# Patient Record
Sex: FEMALE | Race: WHITE
[De-identification: names, ages, dates, MRNs, and addresses within clinical notes are randomized per-mention and may not be internally consistent; named-entity substitution may affect disease eponyms.]

---

## 2020-04-29 ENCOUNTER — HOSPITAL ENCOUNTER (INPATIENT)
Dept: HOSPITAL 94 - ER | Age: 80
LOS: 2 days | Discharge: NURSING, REHAB OR LONG-TERM CARE | DRG: 682 | End: 2020-05-01
Attending: INTERNAL MEDICINE | Admitting: SPECIALIST
Payer: MEDICARE

## 2020-04-29 VITALS — HEIGHT: 63 IN | WEIGHT: 65.7 LBS | BODY MASS INDEX: 11.64 KG/M2

## 2020-04-29 DIAGNOSIS — L03.311: ICD-10-CM

## 2020-04-29 DIAGNOSIS — E87.0: ICD-10-CM

## 2020-04-29 DIAGNOSIS — M41.9: ICD-10-CM

## 2020-04-29 DIAGNOSIS — Z79.899: ICD-10-CM

## 2020-04-29 DIAGNOSIS — R62.7: ICD-10-CM

## 2020-04-29 DIAGNOSIS — Z99.3: ICD-10-CM

## 2020-04-29 DIAGNOSIS — Z74.01: ICD-10-CM

## 2020-04-29 DIAGNOSIS — N18.9: ICD-10-CM

## 2020-04-29 DIAGNOSIS — E43: ICD-10-CM

## 2020-04-29 DIAGNOSIS — I12.9: ICD-10-CM

## 2020-04-29 DIAGNOSIS — Q61.3: ICD-10-CM

## 2020-04-29 DIAGNOSIS — Z90.12: ICD-10-CM

## 2020-04-29 DIAGNOSIS — G24.3: ICD-10-CM

## 2020-04-29 DIAGNOSIS — N17.9: Primary | ICD-10-CM

## 2020-04-29 DIAGNOSIS — E86.0: ICD-10-CM

## 2020-04-29 LAB
ALBUMIN SERPL BCP-MCNC: 2.2 G/DL (ref 3.4–5)
ALBUMIN/GLOB SERPL: 0.7 {RATIO} (ref 1.1–1.5)
ALP SERPL-CCNC: 102 IU/L (ref 46–116)
ALT SERPL W P-5'-P-CCNC: 20 U/L (ref 12–78)
ANION GAP SERPL CALCULATED.3IONS-SCNC: 4 MMOL/L (ref 8–16)
AST SERPL W P-5'-P-CCNC: 20 U/L (ref 10–37)
BASOPHILS # BLD AUTO: 0 X10'3 (ref 0–0.2)
BASOPHILS NFR BLD AUTO: 0.6 % (ref 0–1)
BILIRUB SERPL-MCNC: 0.2 MG/DL (ref 0.1–1)
BUN SERPL-MCNC: 30 MG/DL (ref 7–18)
BUN/CREAT SERPL: 25.9 (ref 6.6–38)
CALCIUM SERPL-MCNC: 8.6 MG/DL (ref 8.5–10.1)
CHLORIDE SERPL-SCNC: 108 MMOL/L (ref 99–107)
CO2 SERPL-SCNC: 34.1 MMOL/L (ref 24–32)
CREAT SERPL-MCNC: 1.16 MG/DL (ref 0.4–0.9)
EOSINOPHIL # BLD AUTO: 0.1 X10'3 (ref 0–0.9)
EOSINOPHIL NFR BLD AUTO: 0.9 % (ref 0–6)
ERYTHROCYTE [DISTWIDTH] IN BLOOD BY AUTOMATED COUNT: 13.8 % (ref 11.5–14.5)
GFR SERPL CREATININE-BSD FRML MDRD: 45 ML/MIN
GLUCOSE SERPL-MCNC: 104 MG/DL (ref 70–104)
HCT VFR BLD AUTO: 37.1 % (ref 35–45)
HGB BLD-MCNC: 12.3 G/DL (ref 12–16)
LG PLATELETS BLD QL SMEAR: (no result)
LYMPHOCYTES # BLD AUTO: 0.8 X10'3 (ref 1.1–4.8)
LYMPHOCYTES NFR BLD AUTO: 10.5 % (ref 21–51)
MAGNESIUM SERPL-MCNC: 2.3 MG/DL (ref 1.5–2.4)
MCH RBC QN AUTO: 32 PG (ref 27–31)
MCHC RBC AUTO-ENTMCNC: 33.1 G/DL (ref 33–36.5)
MCV RBC AUTO: 96.8 FL (ref 78–98)
MONOCYTES # BLD AUTO: 0.5 X10'3 (ref 0–0.9)
MONOCYTES NFR BLD AUTO: 6.2 % (ref 2–12)
NEUTROPHILS # BLD AUTO: 6.4 X10'3 (ref 1.8–7.7)
NEUTROPHILS NFR BLD AUTO: 81.8 % (ref 42–75)
PLATELET # BLD AUTO: 172 X10'3 (ref 140–440)
PLATELET BLD QL SMEAR: NORMAL
PMV BLD AUTO: 12 FL (ref 7.4–10.4)
POTASSIUM SERPL-SCNC: 4.1 MMOL/L (ref 3.5–5.1)
PROT SERPL-MCNC: 5.4 G/DL (ref 6.4–8.2)
RBC # BLD AUTO: 3.83 X10'6 (ref 4.2–5.6)
SODIUM SERPL-SCNC: 146 MMOL/L (ref 135–145)
WBC # BLD AUTO: 7.9 X10'3 (ref 4.5–11)

## 2020-04-29 PROCEDURE — 96365 THER/PROPH/DIAG IV INF INIT: CPT

## 2020-04-29 PROCEDURE — 87077 CULTURE AEROBIC IDENTIFY: CPT

## 2020-04-29 PROCEDURE — 83605 ASSAY OF LACTIC ACID: CPT

## 2020-04-29 PROCEDURE — 83735 ASSAY OF MAGNESIUM: CPT

## 2020-04-29 PROCEDURE — 96361 HYDRATE IV INFUSION ADD-ON: CPT

## 2020-04-29 PROCEDURE — 92616 FEES W/LARYNGEAL SENSE TEST: CPT

## 2020-04-29 PROCEDURE — 84145 PROCALCITONIN (PCT): CPT

## 2020-04-29 PROCEDURE — 87088 URINE BACTERIA CULTURE: CPT

## 2020-04-29 PROCEDURE — 92508 TX SP LANG VOICE COMM GROUP: CPT

## 2020-04-29 PROCEDURE — 97530 THERAPEUTIC ACTIVITIES: CPT

## 2020-04-29 PROCEDURE — 81001 URINALYSIS AUTO W/SCOPE: CPT

## 2020-04-29 PROCEDURE — 36415 COLL VENOUS BLD VENIPUNCTURE: CPT

## 2020-04-29 PROCEDURE — 87081 CULTURE SCREEN ONLY: CPT

## 2020-04-29 PROCEDURE — 87186 SC STD MICRODIL/AGAR DIL: CPT

## 2020-04-29 PROCEDURE — 97161 PT EVAL LOW COMPLEX 20 MIN: CPT

## 2020-04-29 PROCEDURE — 85025 COMPLETE CBC W/AUTO DIFF WBC: CPT

## 2020-04-29 PROCEDURE — 80053 COMPREHEN METABOLIC PANEL: CPT

## 2020-04-29 PROCEDURE — 87040 BLOOD CULTURE FOR BACTERIA: CPT

## 2020-04-29 PROCEDURE — 99285 EMERGENCY DEPT VISIT HI MDM: CPT

## 2020-04-30 VITALS — DIASTOLIC BLOOD PRESSURE: 58 MMHG | SYSTOLIC BLOOD PRESSURE: 124 MMHG

## 2020-04-30 VITALS — SYSTOLIC BLOOD PRESSURE: 141 MMHG | DIASTOLIC BLOOD PRESSURE: 73 MMHG

## 2020-04-30 VITALS — SYSTOLIC BLOOD PRESSURE: 138 MMHG | DIASTOLIC BLOOD PRESSURE: 82 MMHG

## 2020-04-30 VITALS — SYSTOLIC BLOOD PRESSURE: 150 MMHG | DIASTOLIC BLOOD PRESSURE: 79 MMHG

## 2020-04-30 LAB
BACTERIA URNS QL MICRO: (no result) /HPF
CLARITY UR: (no result)
COLOR UR: YELLOW
DEPRECATED SQUAMOUS URNS QL MICRO: (no result) /LPF
GLUCOSE UR STRIP-MCNC: NEGATIVE MG/DL
HGB UR QL STRIP: (no result)
KETONES UR STRIP-MCNC: NEGATIVE MG/DL
LEUKOCYTE ESTERASE UR QL STRIP: (no result)
NITRITE UR QL STRIP: NEGATIVE
PH UR STRIP: 8 [PH] (ref 4.8–8)
PROT UR QL STRIP: 100 MG/DL
SP GR UR STRIP: 1.02 (ref 1–1.03)
URN COLLECT METHOD CLASS: (no result)
UROBILINOGEN UR STRIP-MCNC: 0.2 E.U/DL (ref 0.2–1)
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
## 2020-04-30 NOTE — NUR
Patient in room [facility] 4012. I have received report from Peyton MCGOVERN and had the opportunity to 
ask questions and assume patient care.

## 2020-04-30 NOTE — NUR
received call from family member Manju, pt niece. She was concerned saying, "I hope you 
people aren't feeding her food!  She hasn't eaten any solid food for a year!" I attempted to 
explain to her that the pt had had a speech therapy eval and was put on pureed foods and has 
been offered ensure drinks, but the family member kept interrupting me and using foul 
language.  Manju stated that the patient was supposed to have a cardiac workup for a 
"murmur" that the [facility] MD was concerned about.  I explained that to the best of my knowledge 
her admit was related to malnutrition and concern for chemical imbalance related to that. 
Manju demanded that I have the hospitalist call her back (she was also upset that the [facility] 
MD was not overseeing her care in the hospital), however she hung up before I could get the 
number to call back. I spoke to [facility] triage RN and obtained the contact info

## 2020-04-30 NOTE — NUR
brought to room [facility] via [facility], transferred to bed with ease.  pt a&o, moves with ease in 
bed.  received report from ER RN.

-------------------------------------------------------------------------------

Addendum: 04/30/20 at 0315 by Jania Pierre RN

-------------------------------------------------------------------------------

Amended: Links added.
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
## 2020-04-30 NOTE — NUR
Pt moves easily, turns freq side to side.

-------------------------------------------------------------------------------

Addendum: 04/30/20 at 0315 by Jania Pierre RN

-------------------------------------------------------------------------------

Amended: Links added.

## 2020-04-30 NOTE — NUR
Problems reprioritized. Patient report given, questions answered & plan of care reviewed 
with  RN. pt janis in [place] with PT.

-------------------------------------------------------------------------------

Addendum: 04/30/20 at 0629 by Jania Pierre RN

-------------------------------------------------------------------------------

Amended: Links added.

## 2020-05-01 VITALS — SYSTOLIC BLOOD PRESSURE: 154 MMHG | DIASTOLIC BLOOD PRESSURE: 64 MMHG

## 2020-05-01 VITALS — SYSTOLIC BLOOD PRESSURE: 139 MMHG | DIASTOLIC BLOOD PRESSURE: 93 MMHG

## 2020-05-01 LAB
ALBUMIN SERPL BCP-MCNC: 1.7 G/DL (ref 3.4–5)
ALBUMIN/GLOB SERPL: 0.6 {RATIO} (ref 1.1–1.5)
ALP SERPL-CCNC: 69 IU/L (ref 46–116)
ALT SERPL W P-5'-P-CCNC: 17 U/L (ref 12–78)
ANION GAP SERPL CALCULATED.3IONS-SCNC: 6 MMOL/L (ref 8–16)
AST SERPL W P-5'-P-CCNC: 27 U/L (ref 10–37)
BASOPHILS # BLD AUTO: 0.1 X10'3 (ref 0–0.2)
BASOPHILS NFR BLD AUTO: 1.2 % (ref 0–1)
BILIRUB SERPL-MCNC: 0.3 MG/DL (ref 0.1–1)
BUN SERPL-MCNC: 25 MG/DL (ref 7–18)
BUN/CREAT SERPL: 25.3 (ref 6.6–38)
CALCIUM SERPL-MCNC: 8.3 MG/DL (ref 8.5–10.1)
CHLORIDE SERPL-SCNC: 114 MMOL/L (ref 99–107)
CO2 SERPL-SCNC: 28.2 MMOL/L (ref 24–32)
CREAT SERPL-MCNC: 0.99 MG/DL (ref 0.4–0.9)
EOSINOPHIL # BLD AUTO: 0.1 X10'3 (ref 0–0.9)
EOSINOPHIL NFR BLD AUTO: 1.6 % (ref 0–6)
ERYTHROCYTE [DISTWIDTH] IN BLOOD BY AUTOMATED COUNT: 14.3 % (ref 11.5–14.5)
GFR SERPL CREATININE-BSD FRML MDRD: 54 ML/MIN
GLUCOSE SERPL-MCNC: 89 MG/DL (ref 70–104)
HCT VFR BLD AUTO: 31.3 % (ref 35–45)
HGB BLD-MCNC: 10.4 G/DL (ref 12–16)
LG PLATELETS BLD QL SMEAR: (no result)
LYMPHOCYTES # BLD AUTO: 0.8 X10'3 (ref 1.1–4.8)
LYMPHOCYTES NFR BLD AUTO: 11 % (ref 21–51)
MCH RBC QN AUTO: 32.2 PG (ref 27–31)
MCHC RBC AUTO-ENTMCNC: 33.3 G/DL (ref 33–36.5)
MCV RBC AUTO: 96.8 FL (ref 78–98)
MONOCYTES # BLD AUTO: 0.4 X10'3 (ref 0–0.9)
MONOCYTES NFR BLD AUTO: 5.9 % (ref 2–12)
NEUTROPHILS # BLD AUTO: 5.5 X10'3 (ref 1.8–7.7)
NEUTROPHILS NFR BLD AUTO: 80.3 % (ref 42–75)
PLATELET # BLD AUTO: 135 X10'3 (ref 140–440)
PLATELET BLD QL SMEAR: (no result)
PMV BLD AUTO: 12.1 FL (ref 7.4–10.4)
POTASSIUM SERPL-SCNC: 4 MMOL/L (ref 3.5–5.1)
PROT SERPL-MCNC: 4.5 G/DL (ref 6.4–8.2)
RBC # BLD AUTO: 3.24 X10'6 (ref 4.2–5.6)
SODIUM SERPL-SCNC: 148 MMOL/L (ref 135–145)
WBC # BLD AUTO: 6.9 X10'3 (ref 4.5–11)

## 2020-05-01 NOTE — NUR
Patient stable for discharge. Belongings gathered and sent with patient to [facility]. PIV 
removed cannula intact. Wound care and pictures taken. Family made aware of transfer.

## 2020-05-01 NOTE — NUR
Problems reprioritized. Patient report given, questions answered & plan of care reviewed 
with YENNY MCGOVERN.

## 2020-09-03 ENCOUNTER — HOSPITAL ENCOUNTER (EMERGENCY)
Dept: HOSPITAL 94 - ER | Age: 80
LOS: 1 days | Discharge: NURSING, REHAB OR LONG-TERM CARE | End: 2020-09-04
Payer: MEDICARE

## 2020-09-03 VITALS — WEIGHT: 80.16 LBS | HEIGHT: 64 IN | BODY MASS INDEX: 13.69 KG/M2

## 2020-09-03 VITALS — SYSTOLIC BLOOD PRESSURE: 87 MMHG | DIASTOLIC BLOOD PRESSURE: 54 MMHG

## 2020-09-03 DIAGNOSIS — Z98.890: ICD-10-CM

## 2020-09-03 DIAGNOSIS — Z43.1: Primary | ICD-10-CM

## 2020-09-03 DIAGNOSIS — I10: ICD-10-CM

## 2020-09-03 DIAGNOSIS — Z79.899: ICD-10-CM

## 2020-09-03 PROCEDURE — 99285 EMERGENCY DEPT VISIT HI MDM: CPT

## 2020-09-10 ENCOUNTER — HOSPITAL ENCOUNTER (OUTPATIENT)
Dept: HOSPITAL 94 - SSTAY O | Age: 80
Discharge: NURSING, REHAB OR LONG-TERM CARE | End: 2020-09-10
Attending: RADIOLOGY
Payer: MEDICARE

## 2020-09-10 VITALS — WEIGHT: 145.28 LBS | HEIGHT: 63 IN | BODY MASS INDEX: 25.74 KG/M2

## 2020-09-10 VITALS — SYSTOLIC BLOOD PRESSURE: 114 MMHG | DIASTOLIC BLOOD PRESSURE: 51 MMHG

## 2020-09-10 VITALS — SYSTOLIC BLOOD PRESSURE: 106 MMHG | DIASTOLIC BLOOD PRESSURE: 58 MMHG
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
## 2020-09-10 DIAGNOSIS — K94.29: Primary | ICD-10-CM
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
## 2020-09-10 DIAGNOSIS — Z79.899: ICD-10-CM

## 2020-09-10 PROCEDURE — 74018 RADEX ABDOMEN 1 VIEW: CPT

## 2020-09-10 PROCEDURE — 49465 FLUORO EXAM OF G/COLON TUBE: CPT

## 2020-09-10 NOTE — NUR
Dr. Kitty hughes. MD stated that consent for this procedure is implied and a signature 
is not needed.

## 2020-09-10 NOTE — NUR
Called , spoke to [place], . Pt signs for herself at facility, however at 
this time pt is not oriented to purpose, place or time. Attempted to call niece to obtain 
consent, message left.

## 2020-09-10 NOTE — NUR
Procedure performed at bedside by angio RN & rad tech. No new tube placed, suture previously 
placed removed. Contrast injected, x-ray obtained. Dr. Tang determined existing G tube in 
proper location.

## 2020-10-01 ENCOUNTER — HOSPITAL ENCOUNTER (EMERGENCY)
Dept: HOSPITAL 94 - ER | Age: 80
Discharge: HOME | End: 2020-10-01
Payer: MEDICARE

## 2020-10-01 VITALS — BODY MASS INDEX: 12.12 KG/M2 | WEIGHT: 60.12 LBS | HEIGHT: 59 IN

## 2020-10-01 VITALS — SYSTOLIC BLOOD PRESSURE: 145 MMHG | DIASTOLIC BLOOD PRESSURE: 80 MMHG

## 2020-10-01 DIAGNOSIS — I10: ICD-10-CM

## 2020-10-01 DIAGNOSIS — K94.23: Primary | ICD-10-CM

## 2020-10-01 DIAGNOSIS — Z79.899: ICD-10-CM

## 2020-10-01 LAB
ALBUMIN SERPL BCP-MCNC: 2.6 G/DL (ref 3.4–5)
ALBUMIN/GLOB SERPL: 0.7 {RATIO} (ref 1.1–1.5)
ALP SERPL-CCNC: 85 IU/L (ref 46–116)
ALT SERPL W P-5'-P-CCNC: 25 U/L (ref 12–78)
ANION GAP SERPL CALCULATED.3IONS-SCNC: 7 MMOL/L (ref 8–16)
AST SERPL W P-5'-P-CCNC: 24 U/L (ref 10–37)
BASOPHILS # BLD AUTO: 0 X10'3 (ref 0–0.2)
BASOPHILS NFR BLD AUTO: 0.2 % (ref 0–1)
BILIRUB SERPL-MCNC: 0.3 MG/DL (ref 0.1–1)
BUN SERPL-MCNC: 74 MG/DL (ref 7–18)
BUN/CREAT SERPL: 53.2 (ref 6.6–38)
CALCIUM SERPL-MCNC: 8.8 MG/DL (ref 8.5–10.1)
CHLORIDE SERPL-SCNC: 102 MMOL/L (ref 99–107)
CO2 SERPL-SCNC: 32.6 MMOL/L (ref 24–32)
CREAT SERPL-MCNC: 1.39 MG/DL (ref 0.4–0.9)
EOSINOPHIL # BLD AUTO: 0 X10'3 (ref 0–0.9)
EOSINOPHIL NFR BLD AUTO: 0.4 % (ref 0–6)
ERYTHROCYTE [DISTWIDTH] IN BLOOD BY AUTOMATED COUNT: 13.6 % (ref 11.5–14.5)
GFR SERPL CREATININE-BSD FRML MDRD: 36 ML/MIN
GIANT PLATELETS BLD QL SMEAR: (no result)
GLUCOSE SERPL-MCNC: 103 MG/DL (ref 70–104)
HCT VFR BLD AUTO: 33.4 % (ref 35–45)
HGB BLD-MCNC: 11 G/DL (ref 12–16)
LG PLATELETS BLD QL SMEAR: (no result)
LIPASE SERPL-CCNC: 399 U/L (ref 73–393)
LYMPHOCYTES # BLD AUTO: 0.6 X10'3 (ref 1.1–4.8)
LYMPHOCYTES NFR BLD AUTO: 6.3 % (ref 21–51)
MCH RBC QN AUTO: 30.4 PG (ref 27–31)
MCHC RBC AUTO-ENTMCNC: 32.9 G/DL (ref 33–36.5)
MCV RBC AUTO: 92.3 FL (ref 78–98)
MONOCYTES # BLD AUTO: 0.5 X10'3 (ref 0–0.9)
MONOCYTES NFR BLD AUTO: 5.9 % (ref 2–12)
NEUTROPHILS # BLD AUTO: 8.1 X10'3 (ref 1.8–7.7)
NEUTROPHILS NFR BLD AUTO: 87.2 % (ref 42–75)
PLATELET # BLD AUTO: 220 X10'3 (ref 140–440)
PLATELET BLD QL SMEAR: NORMAL
PMV BLD AUTO: 11.7 FL (ref 7.4–10.4)
POTASSIUM SERPL-SCNC: 3.9 MMOL/L (ref 3.5–5.1)
PROT SERPL-MCNC: 6.5 G/DL (ref 6.4–8.2)
RBC # BLD AUTO: 3.62 X10'6 (ref 4.2–5.6)
SODIUM SERPL-SCNC: 142 MMOL/L (ref 135–145)
WBC # BLD AUTO: 9.3 X10'3 (ref 4.5–11)

## 2020-10-01 PROCEDURE — 84145 PROCALCITONIN (PCT): CPT

## 2020-10-01 PROCEDURE — 80053 COMPREHEN METABOLIC PANEL: CPT

## 2020-10-01 PROCEDURE — 74176 CT ABD & PELVIS W/O CONTRAST: CPT

## 2020-10-01 PROCEDURE — 36415 COLL VENOUS BLD VENIPUNCTURE: CPT

## 2020-10-01 PROCEDURE — 85025 COMPLETE CBC W/AUTO DIFF WBC: CPT

## 2020-10-01 PROCEDURE — 83690 ASSAY OF LIPASE: CPT

## 2020-10-01 PROCEDURE — 99284 EMERGENCY DEPT VISIT MOD MDM: CPT

## 2020-10-01 PROCEDURE — 83605 ASSAY OF LACTIC ACID: CPT

## 2020-10-01 NOTE — NUR
call to Manju pt caregiver and stated spoke with dr chung and only lives 4 
blocks away and will be right down to help pt.

## 2020-10-23 ENCOUNTER — HOSPITAL ENCOUNTER (EMERGENCY)
Dept: HOSPITAL 94 - ER | Age: 80
Discharge: HOME | End: 2020-10-23
Payer: MEDICARE

## 2020-10-23 VITALS — BODY MASS INDEX: 10.16 KG/M2 | HEIGHT: 64 IN | WEIGHT: 59.52 LBS

## 2020-10-23 VITALS — DIASTOLIC BLOOD PRESSURE: 82 MMHG | SYSTOLIC BLOOD PRESSURE: 147 MMHG

## 2020-10-23 DIAGNOSIS — Z79.899: ICD-10-CM

## 2020-10-23 DIAGNOSIS — R10.9: Primary | ICD-10-CM

## 2020-10-23 DIAGNOSIS — I10: ICD-10-CM

## 2020-10-23 DIAGNOSIS — R91.1: ICD-10-CM

## 2020-10-23 DIAGNOSIS — Z00.00: ICD-10-CM

## 2020-10-23 PROCEDURE — 99281 EMR DPT VST MAYX REQ PHY/QHP: CPT

## 2020-11-08 ENCOUNTER — HOSPITAL ENCOUNTER (EMERGENCY)
Dept: HOSPITAL 94 - ER | Age: 80
Discharge: HOME | End: 2020-11-08
Payer: MEDICARE

## 2020-11-08 VITALS — DIASTOLIC BLOOD PRESSURE: 68 MMHG | SYSTOLIC BLOOD PRESSURE: 140 MMHG

## 2020-11-08 VITALS — WEIGHT: 69 LBS | HEIGHT: 64 IN | BODY MASS INDEX: 11.78 KG/M2

## 2020-11-08 DIAGNOSIS — K80.50: ICD-10-CM

## 2020-11-08 DIAGNOSIS — L03.311: Primary | ICD-10-CM

## 2020-11-08 DIAGNOSIS — I10: ICD-10-CM

## 2020-11-08 DIAGNOSIS — Z79.899: ICD-10-CM

## 2020-11-08 DIAGNOSIS — K94.23: ICD-10-CM

## 2020-11-08 LAB
ALBUMIN SERPL BCP-MCNC: 2.4 G/DL (ref 3.4–5)
ALBUMIN/GLOB SERPL: 0.7 {RATIO} (ref 1.1–1.5)
ALP SERPL-CCNC: 81 IU/L (ref 46–116)
ALT SERPL W P-5'-P-CCNC: 25 U/L (ref 12–78)
ANION GAP SERPL CALCULATED.3IONS-SCNC: 4 MMOL/L (ref 8–16)
AST SERPL W P-5'-P-CCNC: 30 U/L (ref 10–37)
BASOPHILS # BLD AUTO: 0 X10'3 (ref 0–0.2)
BASOPHILS NFR BLD AUTO: 0.4 % (ref 0–1)
BILIRUB SERPL-MCNC: 0.4 MG/DL (ref 0.1–1)
BUN SERPL-MCNC: 59 MG/DL (ref 7–18)
BUN/CREAT SERPL: 36.2 (ref 6.6–38)
CALCIUM SERPL-MCNC: 8.3 MG/DL (ref 8.5–10.1)
CHLORIDE SERPL-SCNC: 103 MMOL/L (ref 99–107)
CO2 SERPL-SCNC: 33.3 MMOL/L (ref 24–32)
CREAT SERPL-MCNC: 1.63 MG/DL (ref 0.4–0.9)
EOSINOPHIL # BLD AUTO: 0 X10'3 (ref 0–0.9)
EOSINOPHIL NFR BLD AUTO: 0.3 % (ref 0–6)
ERYTHROCYTE [DISTWIDTH] IN BLOOD BY AUTOMATED COUNT: 15.1 % (ref 11.5–14.5)
GFR SERPL CREATININE-BSD FRML MDRD: 30 ML/MIN
GLUCOSE SERPL-MCNC: 122 MG/DL (ref 70–104)
HCT VFR BLD AUTO: 34.2 % (ref 35–45)
HGB BLD-MCNC: 11.5 G/DL (ref 12–16)
LIPASE SERPL-CCNC: 258 U/L (ref 73–393)
LYMPHOCYTES # BLD AUTO: 0.8 X10'3 (ref 1.1–4.8)
LYMPHOCYTES NFR BLD AUTO: 7.1 % (ref 21–51)
MCH RBC QN AUTO: 31.7 PG (ref 27–31)
MCHC RBC AUTO-ENTMCNC: 33.6 G/DL (ref 33–36.5)
MCV RBC AUTO: 94.5 FL (ref 78–98)
MONOCYTES # BLD AUTO: 0.6 X10'3 (ref 0–0.9)
MONOCYTES NFR BLD AUTO: 5.6 % (ref 2–12)
NEUTROPHILS # BLD AUTO: 9.2 X10'3 (ref 1.8–7.7)
NEUTROPHILS NFR BLD AUTO: 86.6 % (ref 42–75)
PLATELET # BLD AUTO: 193 X10'3 (ref 140–440)
PMV BLD AUTO: 11.9 FL (ref 7.4–10.4)
POTASSIUM SERPL-SCNC: 4 MMOL/L (ref 3.5–5.1)
PROT SERPL-MCNC: 5.7 G/DL (ref 6.4–8.2)
RBC # BLD AUTO: 3.62 X10'6 (ref 4.2–5.6)
SODIUM SERPL-SCNC: 140 MMOL/L (ref 135–145)
WBC # BLD AUTO: 10.7 X10'3 (ref 4.5–11)

## 2020-11-08 PROCEDURE — 83690 ASSAY OF LIPASE: CPT

## 2020-11-08 PROCEDURE — 74018 RADEX ABDOMEN 1 VIEW: CPT

## 2020-11-08 PROCEDURE — 74176 CT ABD & PELVIS W/O CONTRAST: CPT

## 2020-11-08 PROCEDURE — 99285 EMERGENCY DEPT VISIT HI MDM: CPT

## 2020-11-08 PROCEDURE — 85610 PROTHROMBIN TIME: CPT

## 2020-11-08 PROCEDURE — 96360 HYDRATION IV INFUSION INIT: CPT

## 2020-11-08 PROCEDURE — 80053 COMPREHEN METABOLIC PANEL: CPT

## 2020-11-08 PROCEDURE — 71045 X-RAY EXAM CHEST 1 VIEW: CPT

## 2020-11-08 PROCEDURE — 43762 RPLC GTUBE NO REVJ TRC: CPT

## 2020-11-08 PROCEDURE — 85025 COMPLETE CBC W/AUTO DIFF WBC: CPT

## 2020-11-08 RX ADMIN — DIATRIZOATE MEGLUMINE AND DIATRIZOATE SODIUM SCH ML: 660; 100 LIQUID ORAL; RECTAL at 16:30

## 2020-11-08 RX ADMIN — DIATRIZOATE MEGLUMINE AND DIATRIZOATE SODIUM SCH ML: 660; 100 LIQUID ORAL; RECTAL at 17:49

## 2020-11-08 RX ADMIN — DIATRIZOATE MEGLUMINE AND DIATRIZOATE SODIUM SCH ML: 660; 100 LIQUID ORAL; RECTAL at 17:48

## 2020-11-08 RX ADMIN — DIATRIZOATE MEGLUMINE AND DIATRIZOATE SODIUM SCH ML: 660; 100 LIQUID ORAL; RECTAL at 17:15

## 2020-11-08 NOTE — NUR
PA BURNS UPDATED ON PTS REFUSAL OF CARE AND DESIRE TO GO HOME. PA TALKING TO 
PTS CELESTE HINES NOW IN THE ER LOBBY.

## 2020-11-08 NOTE — NUR
Patient resting. Small blood pressure cuff applied to left arm. Patient given 
blanket and helped to position of comfort.

## 2020-11-08 NOTE — NUR
Patient states she does not want to be here. Patient refused cleaning wound and 
taking of temperature. Patient requests to be sent home.

## 2020-11-08 NOTE — NUR
AWAITING READ OF CT, PT UPDATED. PTS NEICE UPDATED OR STATUS THAT WE ARE 
WAITING CT AND TO GIVEN PT 1 LITER OF FLUIDS. FLORA: CELESTE [phone] CELL. SHE 
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 PT CALLING 
OUT "HELP ME" AND RESTATING "I WANT TO GO HOME". REITERATED PLAN TO HER.  
CURRENT VSS.